# Patient Record
Sex: FEMALE | Race: WHITE | NOT HISPANIC OR LATINO | URBAN - NONMETROPOLITAN AREA
[De-identification: names, ages, dates, MRNs, and addresses within clinical notes are randomized per-mention and may not be internally consistent; named-entity substitution may affect disease eponyms.]

---

## 2018-05-24 ENCOUNTER — HOSPITAL ENCOUNTER (EMERGENCY)
Facility: HOSPITAL | Age: 68
Discharge: HOME | End: 2018-05-24
Attending: PHYSICIAN ASSISTANT | Admitting: PHYSICIAN ASSISTANT
Payer: MEDICARE

## 2018-05-24 ENCOUNTER — APPOINTMENT (OUTPATIENT)
Dept: RADIOLOGY | Facility: HOSPITAL | Age: 68
End: 2018-05-24
Attending: PHYSICIAN ASSISTANT
Payer: MEDICARE

## 2018-05-24 VITALS
DIASTOLIC BLOOD PRESSURE: 73 MMHG | RESPIRATION RATE: 16 BRPM | OXYGEN SATURATION: 93 % | HEART RATE: 76 BPM | WEIGHT: 220.02 LBS | SYSTOLIC BLOOD PRESSURE: 133 MMHG | TEMPERATURE: 97.8 F

## 2018-05-24 DIAGNOSIS — E83.42 HYPOMAGNESEMIA: ICD-10-CM

## 2018-05-24 DIAGNOSIS — E11.9: ICD-10-CM

## 2018-05-24 DIAGNOSIS — R06.00 DYSPNEA: Primary | ICD-10-CM

## 2018-05-24 LAB
ALBUMIN SERPL-MCNC: 3.9 G/DL (ref 3.5–5.3)
ALP SERPL-CCNC: 47 U/L (ref 55–142)
ALT SERPL-CCNC: 28 U/L (ref 0–52)
ANION GAP SERPL CALC-SCNC: 10 MMOL/L (ref 3–11)
AST SERPL-CCNC: 24 U/L (ref 0–39)
BASOPHILS # BLD AUTO: 0 10*3/UL
BASOPHILS NFR BLD AUTO: 1 % (ref 0–2)
BILIRUB SERPL-MCNC: 0.49 MG/DL (ref 0–1.4)
BNP SERPL-MCNC: 59 PG/ML (ref 0–99)
BUN SERPL-MCNC: 18 MG/DL (ref 7–25)
CALCIUM ALBUM COR SERPL-MCNC: 9.3 MG/DL (ref 8.5–10.1)
CALCIUM SERPL-MCNC: 9.2 MG/DL (ref 8.6–10.3)
CHLORIDE SERPL-SCNC: 107 MMOL/L (ref 98–107)
CO2 SERPL-SCNC: 24 MMOL/L (ref 21–32)
CREAT SERPL-MCNC: 0.9 MG/DL (ref 0.6–1.2)
D DIMER PPP FEU-MCNC: <0.27 UG/ML FEU (ref 0–0.5)
EOSINOPHIL # BLD AUTO: 0.2 10*3/UL
EOSINOPHIL NFR BLD AUTO: 5 % (ref 0–3)
ERYTHROCYTE [DISTWIDTH] IN BLOOD BY AUTOMATED COUNT: 13.7 % (ref 11.5–14)
GFR SERPL CREATININE-BSD FRML MDRD: 62 ML/MIN/1.73M*2
GLUCOSE SERPL-MCNC: 150 MG/DL (ref 70–105)
HCT VFR BLD AUTO: 39 % (ref 34–45)
HGB BLD-MCNC: 13.2 G/DL (ref 11.5–15.5)
LYMPHOCYTES # BLD AUTO: 1.3 10*3/UL
LYMPHOCYTES NFR BLD AUTO: 31 % (ref 11–47)
MAGNESIUM SERPL-MCNC: 1.5 MG/DL (ref 1.8–2.4)
MCH RBC QN AUTO: 31.3 PG (ref 28–33)
MCHC RBC AUTO-ENTMCNC: 34 G/DL (ref 32–36)
MCV RBC AUTO: 92 FL (ref 81–97)
MONOCYTES # BLD AUTO: 0.4 10*3/UL
MONOCYTES NFR BLD AUTO: 9 % (ref 3–11)
NEUTROPHILS # BLD AUTO: 2.4 10*3/UL
NEUTROPHILS NFR BLD AUTO: 56 % (ref 41–81)
PLATELET # BLD AUTO: 169 10*3/UL (ref 140–350)
PMV BLD AUTO: 8.4 FL (ref 6.9–10.8)
POTASSIUM SERPL-SCNC: 3.7 MMOL/L (ref 3.5–5.1)
PROT SERPL-MCNC: 6.1 G/DL (ref 6–8.3)
RBC # BLD AUTO: 4.23 10*6/ΜL (ref 3.7–5.3)
SODIUM SERPL-SCNC: 141 MMOL/L (ref 135–145)
TROPONIN I SERPL-MCNC: <0.03 NG/ML
WBC # BLD AUTO: 4.4 10*3/UL (ref 4.5–10.5)

## 2018-05-24 PROCEDURE — 85379 FIBRIN DEGRADATION QUANT: CPT | Performed by: PHYSICIAN ASSISTANT

## 2018-05-24 PROCEDURE — 96374 THER/PROPH/DIAG INJ IV PUSH: CPT

## 2018-05-24 PROCEDURE — 93005 ELECTROCARDIOGRAM TRACING: CPT | Performed by: PHYSICIAN ASSISTANT

## 2018-05-24 PROCEDURE — 71045 X-RAY EXAM CHEST 1 VIEW: CPT

## 2018-05-24 PROCEDURE — 99284 EMERGENCY DEPT VISIT MOD MDM: CPT | Performed by: PHYSICIAN ASSISTANT

## 2018-05-24 PROCEDURE — 83880 ASSAY OF NATRIURETIC PEPTIDE: CPT | Performed by: PHYSICIAN ASSISTANT

## 2018-05-24 PROCEDURE — 84484 ASSAY OF TROPONIN QUANT: CPT | Performed by: PHYSICIAN ASSISTANT

## 2018-05-24 PROCEDURE — 80053 COMPREHEN METABOLIC PANEL: CPT | Performed by: PHYSICIAN ASSISTANT

## 2018-05-24 PROCEDURE — 93010 ELECTROCARDIOGRAM REPORT: CPT | Performed by: INTERNAL MEDICINE

## 2018-05-24 PROCEDURE — 85025 COMPLETE CBC W/AUTO DIFF WBC: CPT | Performed by: PHYSICIAN ASSISTANT

## 2018-05-24 PROCEDURE — 6360000200 HC RX 636 W HCPCS (ALT 250 FOR IP): Performed by: PHYSICIAN ASSISTANT

## 2018-05-24 PROCEDURE — 83735 ASSAY OF MAGNESIUM: CPT | Performed by: PHYSICIAN ASSISTANT

## 2018-05-24 PROCEDURE — 36415 COLL VENOUS BLD VENIPUNCTURE: CPT | Performed by: PHYSICIAN ASSISTANT

## 2018-05-24 RX ORDER — ACETAMINOPHEN 500 MG
TABLET ORAL DAILY
COMMUNITY

## 2018-05-24 RX ORDER — METFORMIN HYDROCHLORIDE 500 MG/1
500 TABLET ORAL 2 TIMES DAILY WITH MEALS
COMMUNITY

## 2018-05-24 RX ORDER — TRIAMCINOLONE ACETONIDE 1 MG/G
1 CREAM TOPICAL 2 TIMES DAILY
COMMUNITY

## 2018-05-24 RX ORDER — BUPROPION HYDROCHLORIDE 150 MG/1
150 TABLET, EXTENDED RELEASE ORAL 2 TIMES DAILY
COMMUNITY

## 2018-05-24 RX ORDER — VENLAFAXINE HYDROCHLORIDE 75 MG/1
75 CAPSULE, EXTENDED RELEASE ORAL DAILY
COMMUNITY

## 2018-05-24 RX ORDER — TRAZODONE HYDROCHLORIDE 100 MG/1
100 TABLET ORAL NIGHTLY
COMMUNITY

## 2018-05-24 RX ORDER — MAGNESIUM SULFATE HEPTAHYDRATE 500 MG/ML
1 INJECTION, SOLUTION INTRAMUSCULAR; INTRAVENOUS ONCE
Status: COMPLETED | OUTPATIENT
Start: 2018-05-24 | End: 2018-05-24

## 2018-05-24 RX ORDER — LORATADINE 10 MG/1
10 TABLET ORAL DAILY
COMMUNITY

## 2018-05-24 RX ORDER — DEXTROMETHORPHAN HYDROBROMIDE, GUAIFENESIN 5; 100 MG/5ML; MG/5ML
650 LIQUID ORAL EVERY 8 HOURS PRN
COMMUNITY

## 2018-05-24 RX ORDER — PRAMIPEXOLE DIHYDROCHLORIDE 1 MG/1
2 TABLET ORAL 2 TIMES DAILY
COMMUNITY

## 2018-05-24 RX ORDER — ASPIRIN 81 MG/1
81 TABLET ORAL DAILY
COMMUNITY

## 2018-05-24 RX ORDER — PRAVASTATIN SODIUM 40 MG/1
40 TABLET ORAL DAILY
COMMUNITY

## 2018-05-24 RX ADMIN — MAGNESIUM SULFATE HEPTAHYDRATE 1 G: 500 INJECTION, SOLUTION INTRAMUSCULAR; INTRAVENOUS at 12:30

## 2018-05-24 ASSESSMENT — ENCOUNTER SYMPTOMS
PALPITATIONS: 0
HEMATURIA: 0
SORE THROAT: 0
SEIZURES: 0
FEVER: 0
COUGH: 0
BLOOD IN STOOL: 0
DIARRHEA: 1
BRUISES/BLEEDS EASILY: 1
SINUS PRESSURE: 0
VOMITING: 0
CHILLS: 0
RHINORRHEA: 0
DYSURIA: 0
WEAKNESS: 0
SHORTNESS OF BREATH: 1
CONSTIPATION: 0
DIZZINESS: 1
ABDOMINAL PAIN: 0
LIGHT-HEADEDNESS: 0
HEADACHES: 0
NAUSEA: 0
CHEST TIGHTNESS: 0

## 2018-05-24 NOTE — ED NOTES
Pt presents to ED with c/o SOB x2days.Pt reports has heart problems and wears O2 at night. Pt denies any CP.Pt is dyspeic upon arrival, O2 sat 92% on RA.     Macie Dubose RN  05/24/18 3063

## 2018-05-24 NOTE — NURSING NOTE
IV magnesium infused over 1 hour. IV site clear. Aldair SAAVERDA with pt and reviewed all labs/cxr/ekg. Did ambulate pt in liang, O2 sat 92-94 %. Pt did c/o some SOB with ambulation.

## 2018-05-24 NOTE — DISCHARGE INSTRUCTIONS
Continue with all home medications as prescribed.  Start an over-the-counter magnesium supplement and take it daily or as directed.  With any complaints of shortness of breath consider going to a lower altitude.  Return or go to any local ER with any ongoing or worsening symptoms.  Follow-up with your doctor when you return home.

## 2018-05-24 NOTE — ED PROVIDER NOTES
HPI:  Chief Complaint   Patient presents with   • Shortness of Breath       Delaney is a 67-year-old female who is traveled from her home in [place] with her , leaving home on the 20th, arriving in the [facility] late on the 21st.  She does use oxygen at night along with CPAP, denies history of COPD.  No prior history of tobacco use.  She has had increasing dyspnea over the last 2 days, while at rest and with exertion.  She denies orthopnea, cough, chest pain or increased peripheral edema.  She did try her CPAP yesterday afternoon with a nap and had some improvement.  History of left leg DVT with bilateral pulmonary emboli 1 year ago, still on anticoagulants.  Previous history as well of patent foramen ovale with surgical repair in 2003.            HISTORY:  Past Medical History:   Diagnosis Date   • Arthritis    • Coronary artery disease    • Diabetes mellitus (CMS/HCC)    • DVT (deep venous thrombosis) (CMS/HCC)    • Hypercholesteremia    • Hypertension    • Immune deficiency disorder (CMS/HCC)    • Pulmonary embolism (CMS/HCC)    • Stroke (CMS/HCC)        Past Surgical History:   Procedure Laterality Date   • ABDOMINAL SURGERY     • APPENDECTOMY     • CARDIAC SURGERY     • CHOLECYSTECTOMY     • EYE SURGERY     • HERNIA REPAIR     • HYSTERECTOMY     • JOINT REPLACEMENT     • TONSILLECTOMY         History reviewed. No pertinent family history.    Social History   Substance Use Topics   • Smoking status: Never Smoker   • Smokeless tobacco: Never Used   • Alcohol use No       ROS:  Review of Systems   Constitutional: Negative for chills and fever.   HENT: Negative for congestion, rhinorrhea, sinus pressure and sore throat.    Respiratory: Positive for shortness of breath. Negative for cough and chest tightness.    Cardiovascular: Positive for leg swelling (chronic). Negative for chest pain and palpitations.   Gastrointestinal: Positive for diarrhea. Negative for abdominal pain, blood in stool,  constipation, nausea and vomiting.   Endocrine:        Type II diabetic, well controlled.   Genitourinary: Negative for dysuria and hematuria.   Musculoskeletal:        Slightly unsteady gait, cane as needed   Skin: Negative for pallor.   Neurological: Positive for dizziness. Negative for seizures, syncope, weakness, light-headedness and headaches.   Hematological: Bruises/bleeds easily.       PE:  ED Triage Vitals [05/24/18 1125]   Temp Heart Rate Resp BP SpO2   36.6 °C (97.8 °F) 88 (!) 28 176/79 92 %      Temp Source Heart Rate Source Patient Position BP Location FiO2 (%)   Oral -- -- -- --       Physical Exam  Ambulatory, color was fair, warm and dry, mildly anxious and in no acute distress.  Nursing notes and vitals reviewed, noting the tachypnea.  Able to talk in complete full sentences.  Head is normocephalic, nose and mouth are moist, posterior pharynx clear.  Neck supple without adenopathy, JVD or bruits.  Heart is regular rate and rhythm without murmurs, lung sounds are diminished with body habitus but otherwise clear.  Abdomen is obese.  Extremities with trace pitting edema bilaterally over the shins, bilateral calf tenderness, left worse than the right, no palpable cords or nodules.  Equivocal Homans signs.  Distal pulses palpable.    ED LABS:  Labs Reviewed   CBC WITH AUTO DIFFERENTIAL - Abnormal        Result Value    WBC 4.4 (*)     RBC 4.23      Hemoglobin 13.2      Hematocrit 39.0      MCV 92.0      MCH 31.3      MCHC 34.0      RDW 13.7      Platelets 169      MPV 8.4      Neutrophils% 56      Lymphocytes% 31      Monocytes% 9      Eosinophils% 5 (*)     Basophils% 1      Neutrophils Absolute 2.40      Lymphocytes Absolute 1.30      Monocytes Absolute 0.40      Eosinophils Absolute 0.20      Basophils Absolute 0.00     COMPREHENSIVE METABOLIC PANEL - Abnormal     Sodium 141      Potassium 3.7      Chloride 107      CO2 24      Anion Gap 10      BUN 18      Creatinine 0.9      Glucose 150 (*)      "Calcium 9.2      AST 24      ALT (SGPT) 28      Alkaline Phosphatase 47 (*)     Total Protein 6.1      Albumin 3.9      Total Bilirubin 0.49      eGFR 62      Corrected Calcium 9.3      Narrative:     ESTIMATED GFR CALCULATED USING THE IDMS-TRACEABLE MDRD STUDY EQUATION WITH THE RESULT NORMALIZED TO 1.73M^2 BODY SURFACE AREA.\"    AVERAGE GFR BY AGE RANGE    20 - 30                                  116  30 - 40                                  107  40 - 50                                  99  50 - 60                                  93  60 - 70                                  85  70 - up                                  75   MAGNESIUM - Abnormal     Magnesium 1.5 (*)    B-TYPE NATRIURETIC PEPTIDE (BNP) - Normal    BNP 59      Narrative:     B-TYPE NATRIURETIC PEPTIDE INTERPRETIVE DATA: A cutoff of 100 pg/mL has been demonstrated to provide the maximal combination of sensitivity, specificity, and negative predictive value for contributing to the diagnosis of congestive heart failure (CHF).  A BNP value greater than or equal to 100 pg/mL is consistent with a diagnosis of CHF in the appropriate clinical setting.   TROPONIN I - Normal    Troponin I <0.030     D-DIMER, QUANTITATIVE - Normal    D-Dimer, Quant (FEU) <0.27           ED IMAGES:  XR chest portable 1 view   Final Result       1. Shallow inspiration, no acute abnormality otherwise noted.          ED PROCEDURES:  Procedures    ED COURSE:  ED Course as of May 24 1338   Thu May 24, 2018   1117 EKG sinus rhythm rate 82 without ectopy or acute signs of ischemia or infarct.  Left axis deviation.  [RL]   1235 IV magnesium to be administered after talking with the patient, she had been informed that the effect would not be immediate Magnesium: (!) 1.5 [RL]   1329 Patient has been resting comfortably, sleeping, arouses easily.  Oxygen saturations dropping no lower than 94% on room air while sleeping.  Saturations while up and ambulatory no lower than 92%, asymptomatic.  " [RL]      ED Course User Index  [RL] QUENTIN Diaz       MDM:  MDM  Number of Diagnoses or Management Options  Diabetes mellitus type 2, controlled, without complications (CMS/HCC): minor  Dyspnea: new and requires workup  Hypomagnesemia: new and requires workup  Diagnosis management comments: Symptoms of dyspnea have resolved while in the ER.  No acute abnormalities noted.  Hypomagnesemia was treated after discussion with the patient, most likely not contributing to her complaints of dyspnea but will be treated.  Oxygen saturations not dropping below 92% while up and ambulatory or while sleeping.  Eosinophils are mildly elevated, may be related to environmental allergens, contributing to her symptoms of dyspnea.  D-dimer was low, no further workup for pulmonary emboli or DVT is required at this time.  Troponin and BNP were within normal limits.  Recommend moving on to lower elevation with any ongoing symptoms, follow-up to local ER and follow-up with primary care when she returns home.       Amount and/or Complexity of Data Reviewed  Independent visualization of images, tracings, or specimens: yes    Risk of Complications, Morbidity, and/or Mortality  Presenting problems: moderate  Diagnostic procedures: minimal  Management options: low    Patient Progress  Patient progress: improved      Final diagnoses:   [R06.00] Dyspnea   [E11.9] Diabetes mellitus type 2, controlled, without complications (CMS/HCC)   [E83.42] Hypomagnesemia        QUENTIN Diaz  05/24/18 0841

## 2019-01-25 ENCOUNTER — HOSPITAL (OUTPATIENT)
Dept: OTHER | Age: 69
End: 2019-01-25
Attending: EMERGENCY MEDICINE

## 2019-01-25 LAB
ANALYZER ANC (IANC): NORMAL
ANION GAP SERPL CALC-SCNC: 8 MMOL/L (ref 10–20)
BASOPHILS # BLD: 0.1 THOUSAND/MCL (ref 0–0.3)
BASOPHILS NFR BLD: 1 %
BUN SERPL-MCNC: 19 MG/DL (ref 6–20)
BUN/CREAT SERPL: 23 (ref 7–25)
CALCIUM SERPL-MCNC: 9.4 MG/DL (ref 8.4–10.2)
CHLORIDE: 105 MMOL/L (ref 98–107)
CO2 SERPL-SCNC: 27 MMOL/L (ref 21–32)
CREAT SERPL-MCNC: 0.83 MG/DL (ref 0.51–0.95)
DIFFERENTIAL METHOD BLD: NORMAL
EOSINOPHIL # BLD: 0.2 THOUSAND/MCL (ref 0.1–0.5)
EOSINOPHIL NFR BLD: 3 %
ERYTHROCYTE [DISTWIDTH] IN BLOOD: 12.9 % (ref 11–15)
GLUCOSE SERPL-MCNC: 189 MG/DL (ref 65–99)
HEMATOCRIT: 44.4 % (ref 36–46.5)
HGB BLD-MCNC: 14.6 GM/DL (ref 12–15.5)
IMM GRANULOCYTES # BLD AUTO: 0 THOUSAND/MCL (ref 0–0.2)
IMM GRANULOCYTES NFR BLD: 0 %
INR PPP: 1.3
LYMPHOCYTES # BLD: 1.8 THOUSAND/MCL (ref 1–4)
LYMPHOCYTES NFR BLD: 32 %
MCH RBC QN AUTO: 30.2 PG (ref 26–34)
MCHC RBC AUTO-ENTMCNC: 32.9 GM/DL (ref 32–36.5)
MCV RBC AUTO: 91.7 FL (ref 78–100)
MONOCYTES # BLD: 0.6 THOUSAND/MCL (ref 0.3–0.9)
MONOCYTES NFR BLD: 10 %
NEUTROPHILS # BLD: 3 THOUSAND/MCL (ref 1.8–7.7)
NEUTROPHILS NFR BLD: 54 %
NEUTS SEG NFR BLD: NORMAL %
NRBC (NRBCRE): 0 /100 WBC
PLATELET # BLD: 191 THOUSAND/MCL (ref 140–450)
POTASSIUM SERPL-SCNC: 4.2 MMOL/L (ref 3.4–5.1)
PROTHROMBIN TIME: 13.2 SECONDS (ref 9.7–11.8)
PROTHROMBIN TIME: ABNORMAL
RBC # BLD: 4.84 MILLION/MCL (ref 4–5.2)
SODIUM SERPL-SCNC: 136 MMOL/L (ref 135–145)
WBC # BLD: 5.6 THOUSAND/MCL (ref 4.2–11)

## 2019-03-26 ENCOUNTER — MED INFO FORMS (OUTPATIENT)
Dept: HEALTH INFORMATION MANAGEMENT | Age: 69
End: 2019-03-26

## 2024-04-01 ENCOUNTER — HOSPITAL ENCOUNTER (EMERGENCY)
Dept: HOSPITAL 47 - EC | Age: 74
Discharge: HOME | End: 2024-04-01
Payer: MEDICARE

## 2024-04-01 VITALS — DIASTOLIC BLOOD PRESSURE: 80 MMHG | TEMPERATURE: 98.3 F | SYSTOLIC BLOOD PRESSURE: 158 MMHG

## 2024-04-01 VITALS — HEART RATE: 76 BPM

## 2024-04-01 VITALS — RESPIRATION RATE: 20 BRPM

## 2024-04-01 DIAGNOSIS — J06.9: Primary | ICD-10-CM

## 2024-04-01 DIAGNOSIS — Z86.73: ICD-10-CM

## 2024-04-01 DIAGNOSIS — J18.9: ICD-10-CM

## 2024-04-01 DIAGNOSIS — Z88.2: ICD-10-CM

## 2024-04-01 DIAGNOSIS — J20.9: ICD-10-CM

## 2024-04-01 DIAGNOSIS — Z91.041: ICD-10-CM

## 2024-04-01 PROCEDURE — 99285 EMERGENCY DEPT VISIT HI MDM: CPT

## 2024-04-01 PROCEDURE — 96372 THER/PROPH/DIAG INJ SC/IM: CPT

## 2024-04-01 PROCEDURE — 87636 SARSCOV2 & INF A&B AMP PRB: CPT

## 2024-04-01 PROCEDURE — 94640 AIRWAY INHALATION TREATMENT: CPT

## 2024-04-01 PROCEDURE — 71045 X-RAY EXAM CHEST 1 VIEW: CPT

## 2024-04-01 RX ADMIN — IBUPROFEN STA: 600 TABLET ORAL at 20:10

## 2024-04-01 RX ADMIN — BENZONATATE STA MG: 100 CAPSULE ORAL at 20:21

## 2024-04-01 RX ADMIN — ACETAMINOPHEN STA MG: 500 TABLET ORAL at 19:41

## 2024-04-01 RX ADMIN — DEXTROSE STA MG: 50 INJECTION, SOLUTION INTRAVENOUS at 19:43

## 2024-04-01 RX ADMIN — AZITHROMYCIN STA MG: 500 TABLET, FILM COATED ORAL at 19:39

## 2024-04-01 RX ADMIN — IPRATROPIUM BROMIDE AND ALBUTEROL SULFATE STA ML: .5; 3 SOLUTION RESPIRATORY (INHALATION) at 19:50
